# Patient Record
Sex: MALE | Race: ASIAN | NOT HISPANIC OR LATINO | URBAN - METROPOLITAN AREA
[De-identification: names, ages, dates, MRNs, and addresses within clinical notes are randomized per-mention and may not be internally consistent; named-entity substitution may affect disease eponyms.]

---

## 2024-03-11 ENCOUNTER — EMERGENCY (EMERGENCY)
Facility: HOSPITAL | Age: 39
LOS: 1 days | Discharge: HOME | End: 2024-03-11
Attending: EMERGENCY MEDICINE | Admitting: EMERGENCY MEDICINE
Payer: COMMERCIAL

## 2024-03-11 VITALS
DIASTOLIC BLOOD PRESSURE: 77 MMHG | HEART RATE: 87 BPM | RESPIRATION RATE: 18 BRPM | OXYGEN SATURATION: 94 % | TEMPERATURE: 98 F | HEIGHT: 70 IN | SYSTOLIC BLOOD PRESSURE: 115 MMHG | WEIGHT: 139.99 LBS

## 2024-03-11 VITALS
TEMPERATURE: 98 F | RESPIRATION RATE: 17 BRPM | SYSTOLIC BLOOD PRESSURE: 119 MMHG | OXYGEN SATURATION: 98 % | DIASTOLIC BLOOD PRESSURE: 75 MMHG | HEART RATE: 79 BPM

## 2024-03-11 PROCEDURE — 99284 EMERGENCY DEPT VISIT MOD MDM: CPT

## 2024-03-11 RX ORDER — LEVETIRACETAM 250 MG/1
1000 TABLET, FILM COATED ORAL ONCE
Refills: 0 | Status: COMPLETED | OUTPATIENT
Start: 2024-03-11 | End: 2024-03-11

## 2024-03-11 RX ORDER — LEVETIRACETAM 250 MG/1
1 TABLET, FILM COATED ORAL
Qty: 60 | Refills: 0
Start: 2024-03-11 | End: 2024-04-09

## 2024-03-11 RX ADMIN — LEVETIRACETAM 1000 MILLIGRAM(S): 250 TABLET, FILM COATED ORAL at 16:34

## 2024-03-11 NOTE — ED PROVIDER NOTE - PHYSICAL EXAMINATION
PATIENT HAD CARDIAC APPOINTMENT ON 3/14/22  FOR ECHO / STRESS TEST, WAITING FOR THE REPORT,,OFFICE SAID THEY WILL FAX THE REPORT. CALLED DR GRUBBS`S OFFICE AND LEFT A VM FOR NURSE MATHEWS IN REGARDS TO CARDIOLOGY NOTES. Gen: Well-developed, well-nourished, NAD, VS as noted by nursing. HEENT: NCAT, mmm   Chest: RRR, nl S1 and S2, no m/r/g. Resp: CTAB, no w/r/r  Abd: nl BS, soft, nt/nd. Ext: Warm, dry  Neuro: CN II-XII intact, normal and equal strength, sensation, and reflexes bilaterally, normal gait, speech clear  Psych: AAOx3

## 2024-03-11 NOTE — ED ADULT NURSE NOTE - PAIN RATING/NUMBER SCALE (0-10): ACTIVITY
H/O excision of epidermal inclusion cyst    H/O hemorrhoidectomy  2003  H/O lithotripsy    S/P partial thyroidectomy  LEFT  Status post removal of part of parathyroid     0 (no pain/absence of nonverbal indicators of pain)

## 2024-03-11 NOTE — ED ADULT NURSE NOTE - OBJECTIVE STATEMENT
pt bib ems for tonic clonic seizure witnessed last approx 2 minutes today around 1:30pm. known hx of seizures, compliant with daily keppra. no head strike, denies oral trauma, denies incontinence. pt arrives a+ox4, cooperative, resp even and unlabored with patent airway.

## 2024-03-11 NOTE — ED ADULT TRIAGE NOTE - CHIEF COMPLAINT QUOTE
Pt BIBA s/p 2x tonic-clonic seizures at work. PMH of seizures, pt compliant with 750mg Keppra daily. Pt denies hitting head. Pt recently flew in from [place].

## 2024-03-11 NOTE — ED PROVIDER NOTE - PROGRESS NOTE DETAILS
I called patient's neurologist's office, Dr. Ochoa. Awaiting call back. [phone]. Called patient's neurologist a second time. No call back. I spoke to Dr. Terrell from [facility]. She recommended increasing keppra to 1000mg BID and instructing patient to take an extra dose on the day he travels. Patient has been stable throughout ED stay. will f/u with his neurologist when he gets back to [place].

## 2024-03-11 NOTE — ED PROVIDER NOTE - CLINICAL SUMMARY MEDICAL DECISION MAKING FREE TEXT BOX
Patient with recurrent seizure. Had EEG, MRI, and CT with his outpatient neurologist. No need for further testing today. Will speak to patient's neurologist.

## 2024-03-11 NOTE — ED PROVIDER NOTE - NSFOLLOWUPINSTRUCTIONS_ED_ALL_ED_FT
Seizure, Adult  A seizure is a sudden burst of abnormal electrical and chemical activity in the brain. Seizures usually last from 30 seconds to 2 minutes. The abnormal activity temporarily interrupts normal brain function.    Many types of seizures can affect adults. A seizure can cause many different symptoms depending on where in the brain it starts.    What are the causes?  Common causes of this condition include:  Fever or infection.  Brain injury, head trauma, bleeding in the brain, or a brain tumor.  Low levels of blood sugar or salt (sodium).  Kidney problems or liver problems.  Metabolic disorders or other conditions that are passed from parent to child (are inherited).  Reaction to a substance, such as a drug or a medicine, or suddenly stopping the use of a substance (withdrawal).  A stroke.  Developmental disorders such as autism spectrum disorder or cerebral palsy.  In some cases, the cause of a seizure may not be known. Some people who have a seizure never have another one. A person who has repeated seizures over time without a clear cause has a condition called epilepsy.    What increases the risk?  You are more likely to develop this condition if:  You have a family history of epilepsy.  You have had a tonic–clonic seizure before. This type of seizure causes tightening (contraction) of the muscles of the whole body and loss of consciousness.  You have a history of head trauma, lack of oxygen at birth, or strokes.  What are the signs or symptoms?  There are many different types of seizures. The symptoms vary depending on the type of seizure you have. Symptoms occur during the seizure. They may also occur before a seizure (aura) and after a seizure (postictal). Symptoms may include the following:    Symptoms during a seizure    Uncontrollable shaking (convulsions) with fast, jerky movements of muscles.  Stiffening of the body.  Breathing problems.  Confusion, staring, or unresponsiveness.  Head nodding, eye blinking or fluttering, or rapid eye movements.  Drooling, grunting, or making clicking sounds with your mouth.  Loss of bladder control and bowel control.  Symptoms before a seizure    Fear or anxiety.  Nausea.  Vertigo. This is a feeling like:  You are moving when you are not.  Your surroundings are moving when they are not.  Déjà vu. This is a feeling of having seen or heard something before.  Odd tastes or smells.  Changes in vision, such as seeing flashing lights or spots.  Symptoms after a seizure    Confusion.  Sleepiness.  Headache.  Sore muscles.  How is this diagnosed?  This condition may be diagnosed based on:  A description of your symptoms. Video of your seizures can be helpful.  Your medical history.  A physical exam.  You may also have tests, including:  Blood tests.  CT scan.  MRI.  Electroencephalogram (EEG). This test measures electrical activity in the brain. An EEG can predict whether seizures will return.  A spinal tap, also called a lumbar puncture. This is the removal and testing of fluid that surrounds the brain and spinal cord.  How is this treated?  Most seizures will stop on their own in less than 5 minutes, and no treatment is needed. Seizures that last longer than 5 minutes will usually need treatment.    Seizures may be treated with:  Medicines given through an IV.  Avoiding known triggers, such as medicines that you take for another condition.  Medicines to control seizures or prevent future seizures (antiepileptics), if epilepsy caused your seizures.  Medical devices to prevent and control seizures.  Surgery to stop seizures or to reduce how often seizures happen, if you have epilepsy that does not respond to medicines.  A diet low in carbohydrates and high in fat (ketogenic diet).  Follow these instructions at home:  Medicines    Take over-the-counter and prescription medicines only as told by your health care provider.  Avoid any substances that may prevent your medicine from working properly, such as alcohol.  Activity    Follow instructions about activities, such as driving or swimming, that would be dangerous if you had another seizure. Wait until your health care provider says it is safe to do them.  If you live in the [place] check with your local department of motor vehicles (DMV) to find out about local driving laws. Each state has specific rules about when you can legally drive again.  Get enough rest. Lack of sleep can make seizures more likely to occur.  Educating others      Teach friends and family what to do if you have a seizure. They should:  Help you get down to the ground, to prevent a fall.  Cushion your head and move items away from your body.  Loosen any tight clothing around your neck.  Turn you on your side. If you vomit, this helps keep your airway clear.  Know whether or not you need emergency care.  Stay with you until you recover.  Also, tell them what not to do if you have a seizure. Tell them:  They should not hold you down. Holding you down will not stop the seizure.  They should not put anything in your mouth.  General instructions    Avoid anything that has ever triggered a seizure for you.  Keep a seizure diary. Record what you remember about each seizure, especially anything that might have triggered it.  Keep all follow-up visits. This is important.  Contact a health care provider if:  You have another seizure or seizures. Call each time you have a seizure.  Your seizure pattern changes.  You continue to have seizures with treatment.  You have symptoms of an infection or illness. Either of these might increase your risk of having a seizure.  You are unable to take your medicine.  Get help right away if:  You have:  A seizure that does not stop after 5 minutes.  Several seizures in a row without a complete recovery between seizures.  A seizure that makes it harder to breathe.  A seizure that leaves you unable to speak or use a part of your body.  You do not wake up right away after a seizure.  You injure yourself during a seizure.  You have confusion or pain right after a seizure.  These symptoms may represent a serious problem that is an emergency. Do not wait to see if the symptoms will go away. Get medical help right away. Call your local emergency services (911 in the [place] Do not drive yourself to the hospital.    Summary  Seizures are caused by abnormal electrical and chemical activity in the brain. The activity disrupts normal brain function and can cause various symptoms.  Seizures have many causes, including illness, head injuries, low levels of blood sugar or salt, and certain conditions.  Most seizures will stop on their own in less than 5 minutes. Seizures that last longer than 5 minutes are a medical emergency and need treatment right away.  Many medicines are used to treat seizures. Take over-the-counter and prescription medicines only as told by your health care provider.  This information is not intended to replace advice given to you by your health care provider. Make sure you discuss any questions you have with your health care provider.

## 2024-03-11 NOTE — ED PROVIDER NOTE - OBJECTIVE STATEMENT
Patient reports he had a tonic clonic seizure prior to arrival, witnessed by coworkers. Began having seizures 4 months ago. Last seizure 3 months ago. Takes Keppra 750mg twice a day and denies missing any doses. Flew from [place] yesterday, slept very little last night. Feels like typical seizure. No ha, cp, sob, ap, n/v, tongue biting, incontinence.

## 2024-03-11 NOTE — ED PROVIDER NOTE - PATIENT PORTAL LINK FT
You can access the FollowMyHealth Patient Portal offered by [facility] by registering at the following website: [email or web address]. By joining [facility]’s FollowMyHealth portal, you will also be able to view your health information using other applications (apps) compatible with our system.

## 2024-03-15 DIAGNOSIS — G40.909 EPILEPSY, UNSPECIFIED, NOT INTRACTABLE, WITHOUT STATUS EPILEPTICUS: ICD-10-CM
